# Patient Record
Sex: FEMALE | Race: BLACK OR AFRICAN AMERICAN | URBAN - METROPOLITAN AREA
[De-identification: names, ages, dates, MRNs, and addresses within clinical notes are randomized per-mention and may not be internally consistent; named-entity substitution may affect disease eponyms.]

---

## 2017-02-07 ENCOUNTER — OFFICE VISIT (OUTPATIENT)
Dept: INTERNAL MEDICINE CLINIC | Facility: CLINIC | Age: 67
End: 2017-02-07

## 2017-02-07 VITALS
HEART RATE: 53 BPM | BODY MASS INDEX: 38.75 KG/M2 | DIASTOLIC BLOOD PRESSURE: 73 MMHG | SYSTOLIC BLOOD PRESSURE: 127 MMHG | HEIGHT: 66.75 IN | WEIGHT: 246.88 LBS

## 2017-02-07 DIAGNOSIS — N64.4 BREAST PAIN, LEFT: Primary | ICD-10-CM

## 2017-02-07 PROCEDURE — G0463 HOSPITAL OUTPT CLINIC VISIT: HCPCS | Performed by: INTERNAL MEDICINE

## 2017-02-07 PROCEDURE — 99202 OFFICE O/P NEW SF 15 MIN: CPT | Performed by: INTERNAL MEDICINE

## 2017-02-07 RX ORDER — LAMOTRIGINE 200 MG/1
1 TABLET ORAL 2 TIMES DAILY
COMMUNITY
Start: 2013-07-29

## 2017-02-07 RX ORDER — METOPROLOL SUCCINATE 50 MG/1
1 TABLET, EXTENDED RELEASE ORAL DAILY
Refills: 3 | COMMUNITY
Start: 2016-12-31

## 2017-02-07 RX ORDER — FLUTICASONE PROPIONATE AND SALMETEROL 50; 250 UG/1; UG/1
1 POWDER RESPIRATORY (INHALATION) 2 TIMES DAILY
Refills: 1 | COMMUNITY
Start: 2016-12-19

## 2017-02-07 RX ORDER — HYDROCHLOROTHIAZIDE 50 MG/1
1 TABLET ORAL DAILY
COMMUNITY
Start: 2013-08-28

## 2017-02-07 RX ORDER — ASPIRIN 325 MG
325 TABLET ORAL DAILY
COMMUNITY

## 2017-02-07 RX ORDER — SIMVASTATIN 40 MG
1 TABLET ORAL DAILY
COMMUNITY
Start: 2013-07-29

## 2017-02-07 NOTE — PROGRESS NOTES
Raquel Franco is a 78year old female.  Patient presents with:  Breast Pain: Pt c/o an ache in the left breast when touched for about a week  Rash    HPI:   For the past 1 week, she has had persistent dull pain and tenderness in her left breast.  She says h 2002     Right neck gland enlarged.   (Benign surgical removal.)      Social History:    Smoking Status: Never Smoker                      Alcohol Use: No                   EXAM:   GENERAL: Pleasant female appearing well in no distress  /73 mmHg  Puls

## 2017-02-07 NOTE — PATIENT INSTRUCTIONS
Please schedule mammogram and ultrasound of left breast.  Await upcoming appointment with Dr. Arias Cisneros.

## 2017-03-10 PROBLEM — I10 HTN (HYPERTENSION): Status: ACTIVE | Noted: 2017-03-10

## 2017-03-10 PROBLEM — J45.909 ASTHMA: Status: ACTIVE | Noted: 2017-03-10

## 2017-03-10 PROBLEM — E78.5 HYPERLIPIDEMIA: Status: ACTIVE | Noted: 2017-03-10

## 2017-03-10 PROBLEM — E66.01 SEVERE OBESITY (BMI 35.0-39.9) WITH COMORBIDITY (HCC): Status: ACTIVE | Noted: 2017-03-10

## 2017-03-10 PROBLEM — N64.4 MASTALGIA: Status: ACTIVE | Noted: 2017-03-10

## 2017-06-26 ENCOUNTER — TELEPHONE (OUTPATIENT)
Dept: INTERNAL MEDICINE CLINIC | Facility: CLINIC | Age: 67
End: 2017-06-26

## 2017-06-26 NOTE — TELEPHONE ENCOUNTER
Pt is eligible for a Medicare Annual Health Assessment anytime during the calendar year. Left message to call back [phone].

## 2017-07-28 ENCOUNTER — TELEPHONE (OUTPATIENT)
Dept: INTERNAL MEDICINE CLINIC | Facility: CLINIC | Age: 67
End: 2017-07-28

## 2017-08-18 ENCOUNTER — TELEPHONE (OUTPATIENT)
Dept: INTERNAL MEDICINE CLINIC | Facility: CLINIC | Age: 67
End: 2017-08-18